# Patient Record
Sex: FEMALE | Race: WHITE | NOT HISPANIC OR LATINO | Employment: OTHER | URBAN - NONMETROPOLITAN AREA
[De-identification: names, ages, dates, MRNs, and addresses within clinical notes are randomized per-mention and may not be internally consistent; named-entity substitution may affect disease eponyms.]

---

## 2024-04-03 ENCOUNTER — LAB (OUTPATIENT)
Dept: LAB | Facility: HOSPITAL | Age: 64
End: 2024-04-03
Payer: COMMERCIAL

## 2024-04-03 ENCOUNTER — OFFICE VISIT (OUTPATIENT)
Dept: INTERNAL MEDICINE | Facility: CLINIC | Age: 64
End: 2024-04-03
Payer: COMMERCIAL

## 2024-04-03 VITALS
OXYGEN SATURATION: 100 % | HEIGHT: 63 IN | HEART RATE: 81 BPM | WEIGHT: 186.4 LBS | BODY MASS INDEX: 33.03 KG/M2 | DIASTOLIC BLOOD PRESSURE: 80 MMHG | TEMPERATURE: 97.4 F | SYSTOLIC BLOOD PRESSURE: 134 MMHG

## 2024-04-03 DIAGNOSIS — I10 ESSENTIAL HYPERTENSION: ICD-10-CM

## 2024-04-03 DIAGNOSIS — E10.3593 TYPE 1 DIABETES MELLITUS WITH PROLIFERATIVE RETINOPATHY OF BOTH EYES, MACULAR EDEMA PRESENCE UNSPECIFIED, UNSPECIFIED PROLIFERATIVE RETINOPATHY TYPE: ICD-10-CM

## 2024-04-03 DIAGNOSIS — Z12.12 ENCOUNTER FOR COLORECTAL CANCER SCREENING: ICD-10-CM

## 2024-04-03 DIAGNOSIS — R94.31 ABNORMAL EKG: ICD-10-CM

## 2024-04-03 DIAGNOSIS — E10.3593 TYPE 1 DIABETES MELLITUS WITH PROLIFERATIVE RETINOPATHY OF BOTH EYES, MACULAR EDEMA PRESENCE UNSPECIFIED, UNSPECIFIED PROLIFERATIVE RETINOPATHY TYPE: Primary | ICD-10-CM

## 2024-04-03 DIAGNOSIS — E10.319 DIABETIC RETINOPATHY OF BOTH EYES ASSOCIATED WITH TYPE 1 DIABETES MELLITUS, MACULAR EDEMA PRESENCE UNSPECIFIED, UNSPECIFIED RETINOPATHY SEVERITY: ICD-10-CM

## 2024-04-03 DIAGNOSIS — Z00.00 HEALTHCARE MAINTENANCE: ICD-10-CM

## 2024-04-03 DIAGNOSIS — M79.7 FIBROMYALGIA: ICD-10-CM

## 2024-04-03 DIAGNOSIS — E55.9 VITAMIN D DEFICIENCY: ICD-10-CM

## 2024-04-03 DIAGNOSIS — E61.1 IRON DEFICIENCY: ICD-10-CM

## 2024-04-03 DIAGNOSIS — Z12.31 ENCOUNTER FOR SCREENING MAMMOGRAM FOR MALIGNANT NEOPLASM OF BREAST: ICD-10-CM

## 2024-04-03 DIAGNOSIS — Z12.11 ENCOUNTER FOR COLORECTAL CANCER SCREENING: ICD-10-CM

## 2024-04-03 LAB
ALBUMIN SERPL-MCNC: 4.4 G/DL (ref 3.5–5.2)
ALBUMIN/GLOB SERPL: 1.4 G/DL
ALP SERPL-CCNC: 81 U/L (ref 39–117)
ALT SERPL W P-5'-P-CCNC: 45 U/L (ref 1–33)
ANION GAP SERPL CALCULATED.3IONS-SCNC: 11 MMOL/L (ref 5–15)
AST SERPL-CCNC: 39 U/L (ref 1–32)
BILIRUB SERPL-MCNC: 0.7 MG/DL (ref 0–1.2)
BUN SERPL-MCNC: 14 MG/DL (ref 8–23)
BUN/CREAT SERPL: 25 (ref 7–25)
CALCIUM SPEC-SCNC: 9.6 MG/DL (ref 8.6–10.5)
CHLORIDE SERPL-SCNC: 102 MMOL/L (ref 98–107)
CO2 SERPL-SCNC: 28 MMOL/L (ref 22–29)
CREAT SERPL-MCNC: 0.56 MG/DL (ref 0.57–1)
DEPRECATED RDW RBC AUTO: 41.6 FL (ref 37–54)
EGFRCR SERPLBLD CKD-EPI 2021: 104 ML/MIN/1.73
ERYTHROCYTE [DISTWIDTH] IN BLOOD BY AUTOMATED COUNT: 12.7 % (ref 12.3–15.4)
GLOBULIN UR ELPH-MCNC: 3.1 GM/DL
GLUCOSE SERPL-MCNC: 128 MG/DL (ref 65–99)
HCT VFR BLD AUTO: 48 % (ref 34–46.6)
HGB BLD-MCNC: 15.4 G/DL (ref 12–15.9)
MCH RBC QN AUTO: 28.7 PG (ref 26.6–33)
MCHC RBC AUTO-ENTMCNC: 32.1 G/DL (ref 31.5–35.7)
MCV RBC AUTO: 89.4 FL (ref 79–97)
PLATELET # BLD AUTO: 185 10*3/MM3 (ref 140–450)
PMV BLD AUTO: 9.1 FL (ref 6–12)
POTASSIUM SERPL-SCNC: 3.4 MMOL/L (ref 3.5–5.2)
PROT SERPL-MCNC: 7.5 G/DL (ref 6–8.5)
RBC # BLD AUTO: 5.37 10*6/MM3 (ref 3.77–5.28)
SODIUM SERPL-SCNC: 141 MMOL/L (ref 136–145)
WBC NRBC COR # BLD AUTO: 11.97 10*3/MM3 (ref 3.4–10.8)

## 2024-04-03 PROCEDURE — 82043 UR ALBUMIN QUANTITATIVE: CPT

## 2024-04-03 PROCEDURE — 82570 ASSAY OF URINE CREATININE: CPT

## 2024-04-03 PROCEDURE — 83036 HEMOGLOBIN GLYCOSYLATED A1C: CPT

## 2024-04-03 PROCEDURE — 82306 VITAMIN D 25 HYDROXY: CPT

## 2024-04-03 PROCEDURE — 83540 ASSAY OF IRON: CPT

## 2024-04-03 PROCEDURE — 85027 COMPLETE CBC AUTOMATED: CPT

## 2024-04-03 PROCEDURE — 80061 LIPID PANEL: CPT

## 2024-04-03 PROCEDURE — 82728 ASSAY OF FERRITIN: CPT

## 2024-04-03 PROCEDURE — 36415 COLL VENOUS BLD VENIPUNCTURE: CPT

## 2024-04-03 PROCEDURE — 84466 ASSAY OF TRANSFERRIN: CPT

## 2024-04-03 PROCEDURE — 80053 COMPREHEN METABOLIC PANEL: CPT

## 2024-04-03 RX ORDER — CHOLECALCIFEROL (VITAMIN D3) 125 MCG
5 CAPSULE ORAL NIGHTLY
COMMUNITY

## 2024-04-03 RX ORDER — MELOXICAM 15 MG/1
15 TABLET ORAL DAILY
COMMUNITY

## 2024-04-03 RX ORDER — FAMOTIDINE 10 MG
10 TABLET ORAL NIGHTLY PRN
COMMUNITY

## 2024-04-03 RX ORDER — FLUTICASONE PROPIONATE 50 MCG
2 SPRAY, SUSPENSION (ML) NASAL DAILY
COMMUNITY

## 2024-04-03 RX ORDER — INSULIN LISPRO 100 [IU]/ML
100 INJECTION, SOLUTION INTRAVENOUS; SUBCUTANEOUS
COMMUNITY

## 2024-04-03 RX ORDER — NAPROXEN 500 MG/1
500 TABLET ORAL 2 TIMES DAILY WITH MEALS
COMMUNITY

## 2024-04-03 RX ORDER — LOSARTAN POTASSIUM AND HYDROCHLOROTHIAZIDE 12.5; 5 MG/1; MG/1
1 TABLET ORAL DAILY
COMMUNITY

## 2024-04-03 RX ORDER — LORATADINE 10 MG/1
10 TABLET ORAL DAILY
COMMUNITY

## 2024-04-03 RX ORDER — ASPIRIN 81 MG/1
81 TABLET, CHEWABLE ORAL DAILY
COMMUNITY

## 2024-04-03 NOTE — PROGRESS NOTES
Chief Complaint   Patient presents with    [facility]    Diabetes         History:  Joan Ivy is a 63 y.o. female who presents today for evaluation of the above problems.      Joan presents today to [facility].  She is from this area but recently moved back 2 weeks ago from [place] to be closer to family.    She has a history of type 1 diabetes with diabetic retinopathy.  She has been on an insulin pump for the last 23 years.  Since moving she has lost 10 pounds and has required less insulin as such.  She has diabetic retinopathy without edema in the left eye and has macular edema in the right eye.  She has been receiving injections in [place] at [facility].    Total daily dose of insulin is up to 100 units/day.    She has never had a colonoscopy due to her glucose levels bottoming out after fasting.  She has tried twice without any success.  She has not had a Cologuard but is interested in getting this test.    Her baseline EKG is reported to be abnormal.  We do not have any records of this.  She has no chest pains, palpitations or shortness of breath.    She is not on a statin due to myalgias.  She has tried 5 different statins all causing myalgias.  She is not sure if she has tried ezetimibe.    She will take as needed meloxicam to help with the fibromyalgia flare.  If the flare lasts longer she will start taking naproxen.  She never takes both medications at the same time.    HPI      ROS:  Review of Systems    As above      Current Outpatient Medications:     aspirin 81 MG chewable tablet, Chew 1 tablet Daily., Disp: , Rfl:     famotidine (PEPCID) 10 MG tablet, Take 1 tablet by mouth At Night As Needed for Heartburn., Disp: , Rfl:     fluticasone (FLONASE) 50 MCG/ACT nasal spray, 2 sprays into the nostril(s) as directed by provider Daily., Disp: , Rfl:     HumaLOG 100 UNIT/ML injection, 100 Units. Up to 100 u per day via pump, Disp: , Rfl:     loratadine (Claritin) 10 MG  "tablet, Take 1 tablet by mouth Daily., Disp: , Rfl:     losartan-hydrochlorothiazide (HYZAAR) 50-12.5 MG per tablet, Take 1 tablet by mouth Daily., Disp: , Rfl:     melatonin 5 MG tablet tablet, Take 1 tablet by mouth Every Night., Disp: , Rfl:     meloxicam (MOBIC) 15 MG tablet, Take 1 tablet by mouth Daily., Disp: , Rfl:     naproxen (NAPROSYN) 500 MG tablet, Take 1 tablet by mouth 2 (Two) Times a Day With Meals., Disp: , Rfl:     No results found for: \"GLUCOSE\", \"BUN\", \"CREATININE\", \"EGFRRESULT\", \"EGFR\", \"BCR\", \"K\", \"CO2\", \"CALCIUM\", \"PROTENTOTREF\", \"ALBUMIN\", \"BILITOT\", \"AST\", \"ALT\"    No results found for: \"WBC\", \"RBC\", \"HGB\", \"HCT\", \"MCV\", \"MCH\", \"MCHC\", \"RDW\", \"RDWSD\", \"MPV\", \"PLT\", \"NEUTRORELPCT\", \"LYMPHORELPCT\", \"MONORELPCT\", \"EOSRELPCT\", \"BASORELPCT\", \"AUTOIGPER\", \"NEUTROABS\", \"LYMPHSABS\", \"MONOSABS\", \"EOSABS\", \"BASOSABS\", \"AUTOIGNUM\", \"NRBC\"      OBJECTIVE:  Visit Vitals  /80 (BP Location: Left arm, Patient Position: Sitting, Cuff Size: Adult)   Pulse 81   Temp 97.4 °F (36.3 °C) (Temporal)   Ht 160 cm (63\")   Wt 84.6 kg (186 lb 6.4 oz)   SpO2 100%   BMI 33.02 kg/m²      Physical Exam  Vitals and nursing note reviewed.   Constitutional:       General: She is not in acute distress.     Appearance: Normal appearance. She is well-developed. She is not diaphoretic.      Comments: Very pleasant   HENT:      Head: Normocephalic and atraumatic.   Eyes:      Pupils: Pupils are equal, round, and reactive to light.   Neck:      Thyroid: No thyromegaly.      Trachea: Phonation normal.   Cardiovascular:      Rate and Rhythm: Normal rate and regular rhythm.      Heart sounds: No murmur heard.  Pulmonary:      Effort: No respiratory distress.      Breath sounds: No wheezing or rales.   Skin:     Coloration: Skin is not pale.      Findings: No erythema.   Neurological:      Mental Status: She is alert.   Psychiatric:         Behavior: Behavior normal.         Thought Content: Thought content normal.         " Judgment: Judgment normal.         ECG 12 Lead    Date/Time: 4/3/2024 3:04 PM  Performed by: RENE Gonzalez MD    Authorized by: RENE Gonzalez MD  Comparison: not compared with previous ECG   Previous ECG: no previous ECG available  Rhythm: sinus rhythm  Rate: normal  BPM: 73  Conduction: conduction normal  ST Depression: V3, V4, V5 and V6  T inversion: V1, V2, V3, V4, V5 and V6  QRS axis: normal    Clinical impression: abnormal EKG            Assessment/Plan    Diagnoses and all orders for this visit:    1. Type 1 diabetes mellitus with proliferative retinopathy of both eyes, macular edema presence unspecified, unspecified proliferative retinopathy type (Primary)  -     Ambulatory Referral to Endocrinology  -     Hemoglobin A1c; Future  -     Microalbumin / Creatinine Urine Ratio - Urine, Clean Catch; Future    2. Essential hypertension    3. Fibromyalgia    4. Healthcare maintenance  -     Comprehensive Metabolic Panel; Future  -     Hemoglobin A1c; Future  -     Lipid Panel; Future    5. Iron deficiency  -     CBC (No Diff); Future  -     Iron Profile; Future  -     Ferritin; Future    6. Vitamin D deficiency  -     Vitamin D,25-Hydroxy; Future    7. Encounter for colorectal cancer screening  -     Cologuard - Stool, Per Rectum; Future    8. Encounter for screening mammogram for malignant neoplasm of breast  -     Mammo Screening Digital Tomosynthesis Bilateral With CAD; Future    9. Diabetic retinopathy of both eyes associated with type 1 diabetes mellitus, macular edema presence unspecified, unspecified retinopathy severity  -     Ambulatory Referral to Ophthalmology    10. Abnormal EKG  -     ECG 12 Lead      We will check some baseline labs today.  This includes CBC, CMP, A1c, lipid panel, microalbumin/creatinine ratio, iron profile, ferritin, and vitamin D.  Further workup or management depending on those results.    Refer to endocrinology to help manage her insulin pump.  I can refill her insulin  in the meantime.    Refer to ophthalmology for her diabetic retinopathy.    She has not tolerated attempts at colonoscopy in the past due to hypoglycemic episodes.  Therefore, we will screen with a Cologuard.    I have ordered mammogram for screening purposes.    She reported baseline EKG that is abnormal.  I do not have record of this, so baseline EKG was obtained today for our records.  She has ST deviation and T wave inversion in the anterolateral leads.  Again, she has no chest pains or other symptoms.      Return in about 6 months (around 10/3/2024) for Recheck A1c.      JOSE MIGUEL Gonzalez MD  13:59 CDT  4/3/2024   Electronically signed

## 2024-04-04 ENCOUNTER — PATIENT ROUNDING (BHMG ONLY) (OUTPATIENT)
Dept: INTERNAL MEDICINE | Facility: CLINIC | Age: 64
End: 2024-04-04
Payer: COMMERCIAL

## 2024-04-04 LAB
25(OH)D3 SERPL-MCNC: 19.5 NG/ML (ref 30–100)
ALBUMIN UR-MCNC: 1.7 MG/DL
CHOLEST SERPL-MCNC: 187 MG/DL (ref 0–200)
CREAT UR-MCNC: 158.6 MG/DL
FERRITIN SERPL-MCNC: 89.6 NG/ML (ref 13–150)
HBA1C MFR BLD: 6.7 % (ref 4.8–5.6)
HDLC SERPL-MCNC: 65 MG/DL (ref 40–60)
IRON 24H UR-MRATE: 59 MCG/DL (ref 37–145)
IRON SATN MFR SERPL: 14 % (ref 20–50)
LDLC SERPL CALC-MCNC: 103 MG/DL (ref 0–100)
LDLC/HDLC SERPL: 1.54 {RATIO}
MICROALBUMIN/CREAT UR: 10.7 MG/G (ref 0–29)
TIBC SERPL-MCNC: 428 MCG/DL (ref 298–536)
TRANSFERRIN SERPL-MCNC: 287 MG/DL (ref 200–360)
TRIGL SERPL-MCNC: 109 MG/DL (ref 0–150)
VLDLC SERPL-MCNC: 19 MG/DL (ref 5–40)

## 2024-04-04 NOTE — PROGRESS NOTES
April 4, 2024    Hello, may I speak with Joan Ivy?    My name is EVERARDO VENTURA      I am  with [facility]  [address], [address]  [place] [address] [phone].    Before we get started may I verify your date of birth? 1960    I am calling to officially welcome you to our practice and ask about your recent visit. Is this a good time to talk? yes    Tell me about your visit with us. What things went well?  IT WAS FINE. HE WAS NICE.       We're always looking for ways to make our patients' experiences even better. Do you have recommendations on ways we may improve?  no    Overall were you satisfied with your first visit to our practice? yes       I appreciate you taking the time to speak with me today. Is there anything else I can do for you? no      Thank you, and have a great day.

## 2024-04-10 DIAGNOSIS — R71.8 ELEVATED HEMATOCRIT: Primary | ICD-10-CM

## 2024-04-10 DIAGNOSIS — E87.6 HYPOKALEMIA: ICD-10-CM

## 2024-04-10 DIAGNOSIS — D72.829 LEUKOCYTOSIS, UNSPECIFIED TYPE: ICD-10-CM

## 2024-04-10 RX ORDER — POTASSIUM CHLORIDE 750 MG/1
10 TABLET, EXTENDED RELEASE ORAL 2 TIMES DAILY
Qty: 4 TABLET | Refills: 0 | Status: SHIPPED | OUTPATIENT
Start: 2024-04-10 | End: 2024-04-12

## 2024-04-15 LAB
NCCN CRITERIA FLAG: NORMAL
TYRER CUZICK SCORE: 12.5

## 2024-04-29 ENCOUNTER — HOSPITAL ENCOUNTER (OUTPATIENT)
Dept: MAMMOGRAPHY | Facility: HOSPITAL | Age: 64
Discharge: HOME | End: 2024-04-29
Admitting: INTERNAL MEDICINE
Payer: COMMERCIAL

## 2024-04-29 DIAGNOSIS — Z12.31 ENCOUNTER FOR SCREENING MAMMOGRAM FOR MALIGNANT NEOPLASM OF BREAST: ICD-10-CM

## 2024-04-29 PROCEDURE — 77063 BREAST TOMOSYNTHESIS BI: CPT

## 2024-04-29 PROCEDURE — 77067 SCR MAMMO BI INCL CAD: CPT

## 2024-06-25 ENCOUNTER — OFFICE VISIT (OUTPATIENT)
Dept: INTERNAL MEDICINE | Facility: CLINIC | Age: 64
End: 2024-06-25
Payer: COMMERCIAL

## 2024-06-25 VITALS
HEIGHT: 63 IN | HEART RATE: 78 BPM | SYSTOLIC BLOOD PRESSURE: 166 MMHG | TEMPERATURE: 98 F | WEIGHT: 183 LBS | DIASTOLIC BLOOD PRESSURE: 86 MMHG | OXYGEN SATURATION: 97 % | BODY MASS INDEX: 32.43 KG/M2

## 2024-06-25 DIAGNOSIS — J06.9 UPPER RESPIRATORY TRACT INFECTION, UNSPECIFIED TYPE: Primary | ICD-10-CM

## 2024-06-25 DIAGNOSIS — H65.93 FLUID LEVEL BEHIND TYMPANIC MEMBRANE OF BOTH EARS: ICD-10-CM

## 2024-06-25 DIAGNOSIS — I10 HYPERTENSION, UNSPECIFIED TYPE: ICD-10-CM

## 2024-06-25 PROCEDURE — 99214 OFFICE O/P EST MOD 30 MIN: CPT

## 2024-06-25 PROCEDURE — 96372 THER/PROPH/DIAG INJ SC/IM: CPT

## 2024-06-25 RX ORDER — LOSARTAN POTASSIUM AND HYDROCHLOROTHIAZIDE 25; 100 MG/1; MG/1
1 TABLET ORAL DAILY
Qty: 30 TABLET | Refills: 1 | Status: SHIPPED | OUTPATIENT
Start: 2024-06-25

## 2024-06-25 RX ORDER — METHYLPREDNISOLONE ACETATE 40 MG/ML
40 INJECTION, SUSPENSION INTRA-ARTICULAR; INTRALESIONAL; INTRAMUSCULAR; SOFT TISSUE ONCE
Status: COMPLETED | OUTPATIENT
Start: 2024-06-25 | End: 2024-06-25

## 2024-06-25 RX ORDER — MELATONIN
1000 DAILY
COMMUNITY

## 2024-06-25 RX ORDER — METHYLPREDNISOLONE SODIUM SUCCINATE 40 MG/ML
40 INJECTION, POWDER, LYOPHILIZED, FOR SOLUTION INTRAMUSCULAR; INTRAVENOUS ONCE
Status: DISCONTINUED | OUTPATIENT
Start: 2024-06-25 | End: 2024-06-25

## 2024-06-25 RX ORDER — LEVOCETIRIZINE DIHYDROCHLORIDE 5 MG/1
5 TABLET, FILM COATED ORAL EVERY EVENING
Qty: 30 TABLET | Refills: 1 | Status: SHIPPED | OUTPATIENT
Start: 2024-06-25

## 2024-06-25 RX ADMIN — METHYLPREDNISOLONE ACETATE 40 MG: 40 INJECTION, SUSPENSION INTRA-ARTICULAR; INTRALESIONAL; INTRAMUSCULAR; SOFT TISSUE at 09:39

## 2024-06-25 NOTE — PROGRESS NOTES
Chief Complaint   Patient presents with    Ear Fullness     right    Swollen Glands     On right side of face     Answers submitted by the patient for this visit:  Primary Reason for Visit (Submitted on 6/25/2024)  What is the primary reason for your visit?: Ear Pain  Ear Pain Questionnaire (Submitted on 6/25/2024)  Chief Complaint: Ear pain  hoarse voice: No  jaw pain: Yes      History:  Joan Ivy is a 64 y.o. female who presents today for evaluation of the above problems.      Ear Fullness   Associated symptoms include headaches and neck pain. Pertinent negatives include no coughing, hearing loss, rash, rhinorrhea or sore throat.   Swollen Glands  Associated symptoms include congestion, headaches, neck pain and swollen glands. Pertinent negatives include no chest pain, coughing, rash or sore throat.     Ms. Ivy presents today for evaluation of sinus congestion and right ear fullness.  She reports that she has had an increase of allergies over the last several months.  Over the course of the last 2 to 3 days she has had an increase of sinus congestion, right ear fullness, and pressure and fluid on the right side of her face.  She also reports postnasal drip that is causing a cough.  She is currently using Claritin periodically and Flonase.  She reports that prolonged use of Claritin gave her an increase in headache.    Blood pressure is elevated today at 195/82 initially.  Blood pressure on repeat was 166/86.  She reports that she monitors blood pressure at home with occasional high of 160s over 80s, typically at home blood pressure is around 140/60-70.  She does report recent increase in headaches but denies chest pain or shortness of breath.      ROS:  Review of Systems   HENT:  Positive for congestion, ear pain and tinnitus. Negative for hearing loss, rhinorrhea and sore throat.    Respiratory:  Negative for cough, chest tightness and shortness of breath.    Cardiovascular:  Negative for chest pain and  palpitations.   Musculoskeletal:  Positive for neck pain.   Skin:  Negative for rash.   Neurological:  Positive for dizziness and headaches.   Hematological:  Positive for adenopathy.         Current Outpatient Medications:     aspirin 81 MG chewable tablet, Chew 1 tablet Daily., Disp: , Rfl:     cholecalciferol (Vitamin D) 25 MCG (1000 UT) tablet, Take 1 tablet by mouth Daily., Disp: , Rfl:     famotidine (PEPCID) 10 MG tablet, Take 1 tablet by mouth At Night As Needed for Heartburn., Disp: , Rfl:     fluticasone (FLONASE) 50 MCG/ACT nasal spray, 2 sprays into the nostril(s) as directed by provider Daily., Disp: , Rfl:     HumaLOG 100 UNIT/ML injection, 100 Units. Up to 100 u per day via pump, Disp: , Rfl:     melatonin 5 MG tablet tablet, Take 1 tablet by mouth Every Night., Disp: , Rfl:     meloxicam (MOBIC) 15 MG tablet, Take 1 tablet by mouth Daily., Disp: , Rfl:     naproxen (NAPROSYN) 500 MG tablet, Take 1 tablet by mouth 2 (Two) Times a Day With Meals., Disp: , Rfl:     levocetirizine (XYZAL) 5 MG tablet, Take 1 tablet by mouth Every Evening., Disp: 30 tablet, Rfl: 1    losartan-hydrochlorothiazide (Hyzaar) 100-25 MG per tablet, Take 1 tablet by mouth Daily., Disp: 30 tablet, Rfl: 1  No current facility-administered medications for this visit.    Lab Results   Component Value Date    GLUCOSE 128 (H) 04/03/2024    BUN 14 04/03/2024    CREATININE 0.56 (L) 04/03/2024    EGFR 104.0 04/03/2024    BCR 25.0 04/03/2024    K 3.4 (L) 04/03/2024    CO2 28.0 04/03/2024    CALCIUM 9.6 04/03/2024    ALBUMIN 4.4 04/03/2024    BILITOT 0.7 04/03/2024    AST 39 (H) 04/03/2024    ALT 45 (H) 04/03/2024       WBC   Date Value Ref Range Status   04/03/2024 11.97 (H) 3.40 - 10.80 10*3/mm3 Final     RBC   Date Value Ref Range Status   04/03/2024 5.37 (H) 3.77 - 5.28 10*6/mm3 Final     Hemoglobin   Date Value Ref Range Status   04/03/2024 15.4 12.0 - 15.9 g/dL Final     Hematocrit   Date Value Ref Range Status   04/03/2024 48.0 (H)  "34.0 - 46.6 % Final     MCV   Date Value Ref Range Status   04/03/2024 89.4 79.0 - 97.0 fL Final     MCH   Date Value Ref Range Status   04/03/2024 28.7 26.6 - 33.0 pg Final     MCHC   Date Value Ref Range Status   04/03/2024 32.1 31.5 - 35.7 g/dL Final     RDW   Date Value Ref Range Status   04/03/2024 12.7 12.3 - 15.4 % Final     RDW-SD   Date Value Ref Range Status   04/03/2024 41.6 37.0 - 54.0 fl Final     MPV   Date Value Ref Range Status   04/03/2024 9.1 6.0 - 12.0 fL Final     Platelets   Date Value Ref Range Status   04/03/2024 185 140 - 450 10*3/mm3 Final         OBJECTIVE:  Visit Vitals  /86   Pulse 78   Temp 98 °F (36.7 °C) (Oral)   Ht 160 cm (63\")   Wt 83 kg (183 lb)   SpO2 97%   BMI 32.42 kg/m²      Physical Exam  Constitutional:       Appearance: Normal appearance.   HENT:      Head: Normocephalic.      Right Ear: A middle ear effusion is present. No mastoid tenderness. Tympanic membrane is not perforated or erythematous.      Left Ear: A middle ear effusion is present. No mastoid tenderness. Tympanic membrane is not perforated or erythematous.      Nose: Congestion and rhinorrhea present.      Mouth/Throat:      Mouth: Mucous membranes are moist.   Eyes:      Pupils: Pupils are equal, round, and reactive to light.   Cardiovascular:      Rate and Rhythm: Normal rate and regular rhythm.      Pulses: Normal pulses.      Heart sounds: Normal heart sounds.   Pulmonary:      Effort: Pulmonary effort is normal.      Breath sounds: Normal breath sounds.   Abdominal:      General: Bowel sounds are normal.      Palpations: Abdomen is soft.   Musculoskeletal:         General: Normal range of motion.      Cervical back: Normal range of motion.   Lymphadenopathy:      Cervical: Cervical adenopathy present.   Skin:     General: Skin is warm and dry.      Capillary Refill: Capillary refill takes less than 2 seconds.   Neurological:      Mental Status: She is alert and oriented to person, place, and time. "   Psychiatric:         Mood and Affect: Mood normal.         Behavior: Behavior normal.         Thought Content: Thought content normal.         Judgment: Judgment normal.         Assessment/Plan    Diagnoses and all orders for this visit:    1. Upper respiratory tract infection, unspecified type (Primary)  -     levocetirizine (XYZAL) 5 MG tablet; Take 1 tablet by mouth Every Evening.  Dispense: 30 tablet; Refill: 1  -     Discontinue: methylPREDNISolone sodium succinate (SOLU-Medrol) injection 40 mg  -     methylPREDNISolone acetate (DEPO-medrol) injection 40 mg    2. Fluid level behind tympanic membrane of both ears  -     levocetirizine (XYZAL) 5 MG tablet; Take 1 tablet by mouth Every Evening.  Dispense: 30 tablet; Refill: 1  -     Discontinue: methylPREDNISolone sodium succinate (SOLU-Medrol) injection 40 mg  -     methylPREDNISolone acetate (DEPO-medrol) injection 40 mg    3. Hypertension, unspecified type  -     losartan-hydrochlorothiazide (Hyzaar) 100-25 MG per tablet; Take 1 tablet by mouth Daily.  Dispense: 30 tablet; Refill: 1      Blood pressure is significantly elevated today.  Initially blood pressure was 195/82 followed by 166/86.  She has had a recent increase in headaches but denies chest pain or shortness of breath.  Increase losartan-hydrochlorothiazide to 100-25 mg.  I have instructed her to monitor blood pressure at home daily, call if blood pressures consistently elevated greater than 140/90 or less than 100/60.  Return in 2 weeks to recheck blood pressure.  Seek emergency medical help if any chest pain or persistent shortness of breath occur.    Upper respiratory infection 1 dose of methylprednisolone today to decrease fluid and drainage.  She is aware to closely monitor blood sugar over the next few days.  Also recommend twice daily saline nasal rinses with distilled water followed by Afrin 15 minutes after saline nasal rinses she is aware to only use Afrin for 3 days.  Use Flonase after  Afrin twice daily for the next week then return to once daily.      Return in about 2 weeks (around 7/9/2024).      TRISHA Colon  08:44 CDT  6/25/2024   Electronically signed

## 2024-06-25 NOTE — PATIENT INSTRUCTIONS
-Neilmed saline nasal rinse with distilled water twice daily,   - use afrin 15minutes after each saline nasal rinse (3 days max)  - use flonase after afrin.

## 2024-07-01 ENCOUNTER — PATIENT OUTREACH (OUTPATIENT)
Dept: CASE MANAGEMENT | Facility: OTHER | Age: 64
End: 2024-07-01
Payer: COMMERCIAL

## 2024-07-01 NOTE — OUTREACH NOTE
[facility] Enrollment    Outreach call made to pt identified for [facility]. Pt politely declines at this time. States she has a lot going on. Does report that her bp has improved since pcp increased her medication dose less than a week ago.    Was the enrollment attempt to reach the patient successful?: yes  Date/Time of successful contact: 7/1/2024  3:31 PM  Patient response: not interested       Sara HECTOR  Ambulatory Case Management    7/1/2024, 15:31 EDT

## 2024-08-18 DIAGNOSIS — I10 HYPERTENSION, UNSPECIFIED TYPE: ICD-10-CM

## 2024-08-19 NOTE — TELEPHONE ENCOUNTER
Rx Refill Note  Requested Prescriptions     Pending Prescriptions Disp Refills    losartan-hydrochlorothiazide (HYZAAR) 100-25 MG per tablet [Pharmacy Med Name: LOSARTAN-HCTZ 100-25 MG TAB] 30 tablet 1     Sig: TAKE 1 TABLET BY MOUTH EVERY DAY      Last office visit with prescribing clinician: 6/25/2024   Last telemedicine visit with prescribing clinician: Visit date not found   Next office visit with prescribing clinician: Visit date not found                         Would you like a call back once the refill request has been completed: [] Yes [] No    If the office needs to give you a call back, can they leave a voicemail: [] Yes [] No    Juaquin Fitzgerald MA  08/19/24, 09:04 CDT

## 2024-08-20 RX ORDER — LOSARTAN POTASSIUM AND HYDROCHLOROTHIAZIDE 25; 100 MG/1; MG/1
1 TABLET ORAL DAILY
Qty: 30 TABLET | Refills: 1 | Status: SHIPPED | OUTPATIENT
Start: 2024-08-20

## 2024-08-20 NOTE — TELEPHONE ENCOUNTER
Her blood pressure was significantly elevated at her last appointment, she needs to come back and to have her blood pressure reevaluated.  I have authorized 1 refill to get her through until she is able to come in.

## 2024-10-13 DIAGNOSIS — I10 HYPERTENSION, UNSPECIFIED TYPE: ICD-10-CM

## 2024-10-14 RX ORDER — LOSARTAN POTASSIUM AND HYDROCHLOROTHIAZIDE 25; 100 MG/1; MG/1
1 TABLET ORAL DAILY
Qty: 30 TABLET | Refills: 5 | Status: SHIPPED | OUTPATIENT
Start: 2024-10-14

## 2024-10-14 NOTE — TELEPHONE ENCOUNTER
Rx Refill Note  Requested Prescriptions     Pending Prescriptions Disp Refills    losartan-hydrochlorothiazide (HYZAAR) 100-25 MG per tablet [Pharmacy Med Name: LOSARTAN-HCTZ 100-25 MG TAB] 30 tablet 1     Sig: TAKE 1 TABLET BY MOUTH EVERY DAY      Last office visit with prescribing clinician: 6/25/2024   Last telemedicine visit with prescribing clinician: Visit date not found   Next office visit with prescribing clinician: Visit date not found                         Would you like a call back once the refill request has been completed: [] Yes [] No    If the office needs to give you a call back, can they leave a voicemail: [] Yes [] No    Juaquin Fitzgerald MA  10/14/24, 09:08 CDT

## 2024-10-17 DIAGNOSIS — I10 HYPERTENSION, UNSPECIFIED TYPE: ICD-10-CM

## 2024-10-17 RX ORDER — LOSARTAN POTASSIUM AND HYDROCHLOROTHIAZIDE 25; 100 MG/1; MG/1
1 TABLET ORAL DAILY
Qty: 30 TABLET | Refills: 1 | OUTPATIENT
Start: 2024-10-17

## 2024-10-17 NOTE — TELEPHONE ENCOUNTER
Rx Refill Note  Requested Prescriptions     Pending Prescriptions Disp Refills    losartan-hydrochlorothiazide (HYZAAR) 100-25 MG per tablet [Pharmacy Med Name: LOSARTAN-HCTZ 100-25 MG TAB] 30 tablet 1     Sig: TAKE 1 TABLET BY MOUTH EVERY DAY      Last office visit with prescribing clinician: 6/25/2024   Last telemedicine visit with prescribing clinician: Visit date not found   Next office visit with prescribing clinician: Visit date not found                         Would you like a call back once the refill request has been completed: [] Yes [] No    If the office needs to give you a call back, can they leave a voicemail: [] Yes [] No    Juaquin Fitzgerald MA  10/17/24, 11:02 CDT

## 2024-10-21 ENCOUNTER — LAB (OUTPATIENT)
Dept: LAB | Facility: HOSPITAL | Age: 64
End: 2024-10-21
Payer: COMMERCIAL

## 2024-10-21 DIAGNOSIS — D72.829 LEUKOCYTOSIS, UNSPECIFIED TYPE: ICD-10-CM

## 2024-10-21 DIAGNOSIS — R71.8 ELEVATED HEMATOCRIT: ICD-10-CM

## 2024-10-21 DIAGNOSIS — I10 HYPERTENSION, UNSPECIFIED TYPE: ICD-10-CM

## 2024-10-21 LAB
DEPRECATED RDW RBC AUTO: 42.2 FL (ref 37–54)
ERYTHROCYTE [DISTWIDTH] IN BLOOD BY AUTOMATED COUNT: 13 % (ref 12.3–15.4)
HCT VFR BLD AUTO: 45.1 % (ref 34–46.6)
HGB BLD-MCNC: 15.3 G/DL (ref 12–15.9)
MCH RBC QN AUTO: 30.5 PG (ref 26.6–33)
MCHC RBC AUTO-ENTMCNC: 33.9 G/DL (ref 31.5–35.7)
MCV RBC AUTO: 89.8 FL (ref 79–97)
PLATELET # BLD AUTO: 170 10*3/MM3 (ref 140–450)
PMV BLD AUTO: 9.2 FL (ref 6–12)
RBC # BLD AUTO: 5.02 10*6/MM3 (ref 3.77–5.28)
WBC NRBC COR # BLD AUTO: 10.34 10*3/MM3 (ref 3.4–10.8)

## 2024-10-21 PROCEDURE — 85027 COMPLETE CBC AUTOMATED: CPT

## 2024-10-21 PROCEDURE — 36415 COLL VENOUS BLD VENIPUNCTURE: CPT

## 2024-10-22 DIAGNOSIS — I10 HYPERTENSION, UNSPECIFIED TYPE: ICD-10-CM

## 2024-10-22 RX ORDER — LOSARTAN POTASSIUM AND HYDROCHLOROTHIAZIDE 25; 100 MG/1; MG/1
1 TABLET ORAL DAILY
Qty: 30 TABLET | Refills: 5 | Status: SHIPPED | OUTPATIENT
Start: 2024-10-22

## 2024-10-22 RX ORDER — LOSARTAN POTASSIUM AND HYDROCHLOROTHIAZIDE 25; 100 MG/1; MG/1
1 TABLET ORAL DAILY
Qty: 30 TABLET | Refills: 1 | OUTPATIENT
Start: 2024-10-22

## 2024-10-22 NOTE — TELEPHONE ENCOUNTER
Rx Refill Note  Requested Prescriptions     Pending Prescriptions Disp Refills    losartan-hydrochlorothiazide (HYZAAR) 100-25 MG per tablet [Pharmacy Med Name: LOSARTAN-HCTZ 100-25 MG TAB] 30 tablet 1     Sig: TAKE 1 TABLET BY MOUTH EVERY DAY      Last office visit with prescribing clinician: 6/25/2024   Last telemedicine visit with prescribing clinician: Visit date not found   Next office visit with prescribing clinician: Visit date not found                         Would you like a call back once the refill request has been completed: [] Yes [] No    If the office needs to give you a call back, can they leave a voicemail: [] Yes [] No    Juaquin Fitzgerald MA  10/22/24, 11:17 CDT

## 2024-10-25 ENCOUNTER — OFFICE VISIT (OUTPATIENT)
Dept: INTERNAL MEDICINE | Facility: CLINIC | Age: 64
End: 2024-10-25
Payer: COMMERCIAL

## 2024-10-25 VITALS
HEIGHT: 63 IN | SYSTOLIC BLOOD PRESSURE: 130 MMHG | TEMPERATURE: 98.1 F | HEART RATE: 73 BPM | DIASTOLIC BLOOD PRESSURE: 76 MMHG | OXYGEN SATURATION: 98 % | WEIGHT: 184.8 LBS | BODY MASS INDEX: 32.74 KG/M2

## 2024-10-25 DIAGNOSIS — Z23 NEED FOR IMMUNIZATION AGAINST INFLUENZA: ICD-10-CM

## 2024-10-25 DIAGNOSIS — R53.82 CHRONIC FATIGUE: ICD-10-CM

## 2024-10-25 DIAGNOSIS — E10.3593 TYPE 1 DIABETES MELLITUS WITH PROLIFERATIVE RETINOPATHY OF BOTH EYES, MACULAR EDEMA PRESENCE UNSPECIFIED, UNSPECIFIED PROLIFERATIVE RETINOPATHY TYPE: Primary | ICD-10-CM

## 2024-10-25 DIAGNOSIS — I10 ESSENTIAL HYPERTENSION: ICD-10-CM

## 2024-10-25 DIAGNOSIS — R74.8 ELEVATED LIVER ENZYMES: ICD-10-CM

## 2024-10-25 DIAGNOSIS — E55.9 VITAMIN D DEFICIENCY: ICD-10-CM

## 2024-10-25 DIAGNOSIS — G47.33 OSA (OBSTRUCTIVE SLEEP APNEA): ICD-10-CM

## 2024-10-25 LAB
EXPIRATION DATE: ABNORMAL
HBA1C MFR BLD: 7.2 % (ref 4.5–5.7)
Lab: ABNORMAL

## 2024-10-25 PROCEDURE — 90471 IMMUNIZATION ADMIN: CPT | Performed by: INTERNAL MEDICINE

## 2024-10-25 PROCEDURE — 99214 OFFICE O/P EST MOD 30 MIN: CPT | Performed by: INTERNAL MEDICINE

## 2024-10-25 PROCEDURE — 90656 IIV3 VACC NO PRSV 0.5 ML IM: CPT | Performed by: INTERNAL MEDICINE

## 2024-10-25 PROCEDURE — 83036 HEMOGLOBIN GLYCOSYLATED A1C: CPT | Performed by: INTERNAL MEDICINE

## 2024-10-25 NOTE — PROGRESS NOTES
Chief Complaint   Patient presents with    Follow-up    Diabetes     A1c=7.2         History:  Joan Ivy is a 64 y.o. female who presents today for evaluation of the above problems.      HPI  History of Present Illness  The patient presents for a 6-month follow-up on diabetes.    She has been receiving care in [place] by endocrinology, where adjustments were made to her insulin pump. She is uncertain of her A1c levels but reports experiencing both high and low blood sugar levels. Her most recent readings were 120 and 111. She has a new insulin pump and continues to experience low blood sugar levels upon waking. She had a consultation with Gema Marcelino on 08/13/2024, during which further adjustments were made to her pump. She plans to have another consultation at the end of 11/2024.    She has been taking vitamin D supplements and reports feeling exhausted, which she attributes to her responsibilities in caring for her . She underwent gallbladder removal surgery 2 years ago and experienced significant weakness post-surgery.  Symptoms have persisted since then.     She has been diagnosed with sleep apnea but does not use a CPAP machine.     Her blood pressure medication was increased 4 months ago, and she has been monitoring her blood pressure regularly. She reports that her blood pressure has been low, and she is considering whether her medication could be contributing to her feelings of fatigue. She has always taken her blood pressure medication in the morning and is considering whether taking it at night might improve her sleep. She reports feeling unwell even before the change in her blood pressure medication and is unsure if the change has worsened her condition. She took a half dose of her blood pressure medication yesterday and today due to a shortage of medication, and her blood pressure remained stable.    ROS:  Review of Systems  As above      Current Outpatient Medications:     aspirin 81 MG  chewable tablet, Chew 1 tablet Daily., Disp: , Rfl:     cholecalciferol (Vitamin D) 25 MCG (1000 UT) tablet, Take 1 tablet by mouth Daily., Disp: , Rfl:     famotidine (PEPCID) 10 MG tablet, Take 1 tablet by mouth At Night As Needed for Heartburn., Disp: , Rfl:     fluticasone (FLONASE) 50 MCG/ACT nasal spray, Administer 2 sprays into the nostril(s) as directed by provider Daily., Disp: , Rfl:     HumaLOG 100 UNIT/ML injection, 100 Units. Up to 100 u per day via pump, Disp: , Rfl:     levocetirizine (XYZAL) 5 MG tablet, Take 1 tablet by mouth Every Evening., Disp: 30 tablet, Rfl: 1    losartan-hydrochlorothiazide (HYZAAR) 100-25 MG per tablet, Take 1 tablet by mouth Daily., Disp: 30 tablet, Rfl: 5    melatonin 5 MG tablet tablet, Take 1 tablet by mouth Every Night., Disp: , Rfl:     meloxicam (MOBIC) 15 MG tablet, Take 1 tablet by mouth Daily., Disp: , Rfl:     naproxen (NAPROSYN) 500 MG tablet, Take 1 tablet by mouth 2 (Two) Times a Day With Meals., Disp: , Rfl:     Lab Results   Component Value Date    GLUCOSE 128 (H) 04/03/2024    BUN 14 04/03/2024    CREATININE 0.56 (L) 04/03/2024     04/03/2024    K 3.4 (L) 04/03/2024     04/03/2024    CALCIUM 9.6 04/03/2024    PROTEINTOT 7.5 04/03/2024    ALBUMIN 4.4 04/03/2024    ALT 45 (H) 04/03/2024    AST 39 (H) 04/03/2024    ALKPHOS 81 04/03/2024    BILITOT 0.7 04/03/2024    GLOB 3.1 04/03/2024    AGRATIO 1.4 04/03/2024    BCR 25.0 04/03/2024    ANIONGAP 11.0 04/03/2024    EGFR 104.0 04/03/2024       WBC   Date Value Ref Range Status   10/21/2024 10.34 3.40 - 10.80 10*3/mm3 Final     RBC   Date Value Ref Range Status   10/21/2024 5.02 3.77 - 5.28 10*6/mm3 Final     Hemoglobin   Date Value Ref Range Status   10/21/2024 15.3 12.0 - 15.9 g/dL Final     Hematocrit   Date Value Ref Range Status   10/21/2024 45.1 34.0 - 46.6 % Final     MCV   Date Value Ref Range Status   10/21/2024 89.8 79.0 - 97.0 fL Final     MCH   Date Value Ref Range Status   10/21/2024 30.5  "26.6 - 33.0 pg Final     MCHC   Date Value Ref Range Status   10/21/2024 33.9 31.5 - 35.7 g/dL Final     RDW   Date Value Ref Range Status   10/21/2024 13.0 12.3 - 15.4 % Final     RDW-SD   Date Value Ref Range Status   10/21/2024 42.2 37.0 - 54.0 fl Final     MPV   Date Value Ref Range Status   10/21/2024 9.2 6.0 - 12.0 fL Final     Platelets   Date Value Ref Range Status   10/21/2024 170 140 - 450 10*3/mm3 Final         OBJECTIVE:  Visit Vitals  /76 (BP Location: Left arm, Patient Position: Sitting, Cuff Size: Adult)   Pulse 73   Temp 98.1 °F (36.7 °C) (Temporal)   Ht 160 cm (63\")   Wt 83.8 kg (184 lb 12.8 oz)   SpO2 98%   BMI 32.74 kg/m²      Physical Exam  Vitals and nursing note reviewed.   Constitutional:       General: She is not in acute distress.     Appearance: Normal appearance. She is well-developed. She is not ill-appearing or diaphoretic.      Comments: Pleasant     HENT:      Head: Normocephalic and atraumatic.   Eyes:      Pupils: Pupils are equal, round, and reactive to light.   Neck:      Thyroid: No thyromegaly.      Trachea: Phonation normal.   Pulmonary:      Effort: No respiratory distress.   Skin:     Coloration: Skin is not pale.      Findings: No erythema.   Neurological:      Mental Status: She is alert.   Psychiatric:         Behavior: Behavior normal.         Thought Content: Thought content normal.         Judgment: Judgment normal.         Results  Laboratory Studies  A1c was 7.2% today.    Assessment/Plan      Diagnoses and all orders for this visit:    1. Type 1 diabetes mellitus with proliferative retinopathy of both eyes, macular edema presence unspecified, unspecified proliferative retinopathy type (Primary)  -     POC Glycosylated Hemoglobin (Hb A1C)    2. Essential hypertension    3. Need for immunization against influenza  -     Fluzone >6mos ([id number])    4. Vitamin D deficiency  -     Vitamin D,25-Hydroxy; Future    5. Chronic fatigue  -     Vitamin B12; Future  -     " TSH; Future  -     Comprehensive metabolic panel; Future  -     Vitamin D,25-Hydroxy; Future    6. Elevated liver enzymes  -     Comprehensive metabolic panel; Future    7. BERTRAM (obstructive sleep apnea)      Assessment & Plan  1. Diabetes Mellitus.  Her A1c level has increased from 6.7 in April 2024 to 7.2 currently. She has experienced fluctuations in blood sugar levels, including lows in the morning. Adjustments were made to her insulin pump in August 2024, and further tweaks are planned for the end of November 2024 by endocrinology. Labs will be ordered to assess liver enzymes, vitamin D, B12, and TSH levels. The results will be forwarded to her endocrinologist.    2. Chronic Fatigue.  She reports feeling exhausted and lacking stamina, potentially due to inadequate rest and sleep apnea. A blood workup will be conducted to check liver enzymes, vitamin D, B12, and TSH levels. If the blood work does not reveal any abnormalities, a sleep study may be considered to evaluate for sleep apnea.  I do suspect untreated sleep apnea is either significantly contributing or causing her symptoms of fatigue.    3. Hypertension.  Her blood pressure has been stable after an increase in her medication dosage. She has been monitoring it regularly, and it has remained within a normal range.     She received an influenza vaccine today.  She tolerated this well without immediate side effects or issues.        Return in about 6 months (around 4/25/2025) for Annual physical.      JOSE MIGUEL Gonzalez MD  10:24 CDT  10/25/2024   Electronically signed      Patient or patient representative verbalized consent for the use of Ambient Listening during the visit with  RENE Gonzalez MD for chart documentation. 10/25/2024  10:53 CDT

## 2025-04-18 DIAGNOSIS — I10 HYPERTENSION, UNSPECIFIED TYPE: ICD-10-CM

## 2025-04-18 RX ORDER — LOSARTAN POTASSIUM AND HYDROCHLOROTHIAZIDE 25; 100 MG/1; MG/1
1 TABLET ORAL DAILY
Qty: 90 TABLET | Refills: 3 | Status: SHIPPED | OUTPATIENT
Start: 2025-04-18

## 2025-04-29 ENCOUNTER — LAB (OUTPATIENT)
Dept: LAB | Facility: HOSPITAL | Age: 65
End: 2025-04-29
Payer: COMMERCIAL

## 2025-04-29 DIAGNOSIS — E55.9 VITAMIN D DEFICIENCY: ICD-10-CM

## 2025-04-29 DIAGNOSIS — R53.82 CHRONIC FATIGUE: ICD-10-CM

## 2025-04-29 DIAGNOSIS — R74.8 ELEVATED LIVER ENZYMES: ICD-10-CM

## 2025-04-29 LAB
25(OH)D3 SERPL-MCNC: 44.2 NG/ML (ref 30–100)
ALBUMIN SERPL-MCNC: 4.1 G/DL (ref 3.5–5.2)
ALBUMIN/GLOB SERPL: 1.6 G/DL
ALP SERPL-CCNC: 78 U/L (ref 39–117)
ALT SERPL W P-5'-P-CCNC: 33 U/L (ref 1–33)
ANION GAP SERPL CALCULATED.3IONS-SCNC: 9 MMOL/L (ref 5–15)
AST SERPL-CCNC: 28 U/L (ref 1–32)
BILIRUB SERPL-MCNC: 1 MG/DL (ref 0–1.2)
BUN SERPL-MCNC: 11 MG/DL (ref 8–23)
BUN/CREAT SERPL: 16.7 (ref 7–25)
CALCIUM SPEC-SCNC: 9.6 MG/DL (ref 8.6–10.5)
CHLORIDE SERPL-SCNC: 100 MMOL/L (ref 98–107)
CO2 SERPL-SCNC: 31 MMOL/L (ref 22–29)
CREAT SERPL-MCNC: 0.66 MG/DL (ref 0.57–1)
EGFRCR SERPLBLD CKD-EPI 2021: 99.3 ML/MIN/1.73
GLOBULIN UR ELPH-MCNC: 2.6 GM/DL
GLUCOSE SERPL-MCNC: 133 MG/DL (ref 65–99)
POTASSIUM SERPL-SCNC: 3.9 MMOL/L (ref 3.5–5.2)
PROT SERPL-MCNC: 6.7 G/DL (ref 6–8.5)
SODIUM SERPL-SCNC: 140 MMOL/L (ref 136–145)
TSH SERPL DL<=0.05 MIU/L-ACNC: 1.54 UIU/ML (ref 0.27–4.2)
VIT B12 BLD-MCNC: 586 PG/ML (ref 211–946)

## 2025-04-29 PROCEDURE — 80053 COMPREHEN METABOLIC PANEL: CPT

## 2025-04-29 PROCEDURE — 36415 COLL VENOUS BLD VENIPUNCTURE: CPT

## 2025-04-29 PROCEDURE — 84443 ASSAY THYROID STIM HORMONE: CPT

## 2025-04-29 PROCEDURE — 82607 VITAMIN B-12: CPT

## 2025-04-29 PROCEDURE — 82306 VITAMIN D 25 HYDROXY: CPT

## 2025-05-02 ENCOUNTER — OFFICE VISIT (OUTPATIENT)
Dept: INTERNAL MEDICINE | Facility: CLINIC | Age: 65
End: 2025-05-02
Payer: COMMERCIAL

## 2025-05-02 ENCOUNTER — LAB (OUTPATIENT)
Dept: LAB | Facility: HOSPITAL | Age: 65
End: 2025-05-02
Payer: COMMERCIAL

## 2025-05-02 VITALS
OXYGEN SATURATION: 98 % | HEIGHT: 63 IN | DIASTOLIC BLOOD PRESSURE: 82 MMHG | WEIGHT: 186 LBS | HEART RATE: 74 BPM | BODY MASS INDEX: 32.96 KG/M2 | SYSTOLIC BLOOD PRESSURE: 136 MMHG

## 2025-05-02 DIAGNOSIS — E10.3593 TYPE 1 DIABETES MELLITUS WITH PROLIFERATIVE RETINOPATHY OF BOTH EYES, MACULAR EDEMA PRESENCE UNSPECIFIED, UNSPECIFIED PROLIFERATIVE RETINOPATHY TYPE: ICD-10-CM

## 2025-05-02 DIAGNOSIS — E55.9 VITAMIN D DEFICIENCY: ICD-10-CM

## 2025-05-02 DIAGNOSIS — Z00.00 ANNUAL PHYSICAL EXAM: ICD-10-CM

## 2025-05-02 DIAGNOSIS — I10 ESSENTIAL HYPERTENSION: ICD-10-CM

## 2025-05-02 DIAGNOSIS — Z00.00 ANNUAL PHYSICAL EXAM: Primary | ICD-10-CM

## 2025-05-02 DIAGNOSIS — E66.811 CLASS 1 OBESITY DUE TO EXCESS CALORIES WITH SERIOUS COMORBIDITY AND BODY MASS INDEX (BMI) OF 32.0 TO 32.9 IN ADULT: ICD-10-CM

## 2025-05-02 DIAGNOSIS — E66.09 CLASS 1 OBESITY DUE TO EXCESS CALORIES WITH SERIOUS COMORBIDITY AND BODY MASS INDEX (BMI) OF 32.0 TO 32.9 IN ADULT: ICD-10-CM

## 2025-05-02 DIAGNOSIS — E10.319 DIABETIC RETINOPATHY OF BOTH EYES ASSOCIATED WITH TYPE 1 DIABETES MELLITUS, MACULAR EDEMA PRESENCE UNSPECIFIED, UNSPECIFIED RETINOPATHY SEVERITY: ICD-10-CM

## 2025-05-02 DIAGNOSIS — Z13.31 DEPRESSION SCREEN: ICD-10-CM

## 2025-05-02 LAB
ALBUMIN UR-MCNC: <1.2 MG/DL
CHOLEST SERPL-MCNC: 183 MG/DL (ref 0–200)
CREAT UR-MCNC: 67.3 MG/DL
DEPRECATED RDW RBC AUTO: 41 FL (ref 37–54)
ERYTHROCYTE [DISTWIDTH] IN BLOOD BY AUTOMATED COUNT: 12.5 % (ref 12.3–15.4)
HBA1C MFR BLD: 8.7 % (ref 4.8–5.6)
HCT VFR BLD AUTO: 41.1 % (ref 34–46.6)
HCV AB SER QL: NORMAL
HDLC SERPL-MCNC: 74 MG/DL (ref 40–60)
HGB BLD-MCNC: 13.7 G/DL (ref 12–15.9)
LDLC SERPL CALC-MCNC: 92 MG/DL (ref 0–100)
LDLC/HDLC SERPL: 1.22 {RATIO}
MCH RBC QN AUTO: 29.8 PG (ref 26.6–33)
MCHC RBC AUTO-ENTMCNC: 33.3 G/DL (ref 31.5–35.7)
MCV RBC AUTO: 89.3 FL (ref 79–97)
MICROALBUMIN/CREAT UR: NORMAL MG/G{CREAT}
PLATELET # BLD AUTO: 167 10*3/MM3 (ref 140–450)
PMV BLD AUTO: 9.4 FL (ref 6–12)
RBC # BLD AUTO: 4.6 10*6/MM3 (ref 3.77–5.28)
TRIGL SERPL-MCNC: 93 MG/DL (ref 0–150)
VLDLC SERPL-MCNC: 17 MG/DL (ref 5–40)
WBC NRBC COR # BLD AUTO: 9.17 10*3/MM3 (ref 3.4–10.8)

## 2025-05-02 PROCEDURE — 83036 HEMOGLOBIN GLYCOSYLATED A1C: CPT

## 2025-05-02 PROCEDURE — 82570 ASSAY OF URINE CREATININE: CPT

## 2025-05-02 PROCEDURE — 86803 HEPATITIS C AB TEST: CPT

## 2025-05-02 PROCEDURE — 82043 UR ALBUMIN QUANTITATIVE: CPT

## 2025-05-02 PROCEDURE — 80061 LIPID PANEL: CPT

## 2025-05-02 PROCEDURE — 85027 COMPLETE CBC AUTOMATED: CPT

## 2025-05-02 PROCEDURE — 36415 COLL VENOUS BLD VENIPUNCTURE: CPT

## 2025-05-02 RX ORDER — LORATADINE 10 MG/1
10 TABLET ORAL DAILY
COMMUNITY

## 2025-05-02 NOTE — PROGRESS NOTES
Chief Complaint   Patient presents with    Annual Exam       History:  Joan Ivy is a 64 y.o. female who presents today for evaluation of the above problems.      HPI  History of Present Illness  The patient is a 64-year-old female who presents for an annual exam.    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 Respicare therapy was initiated a few weeks ago and has been beneficial. A history of allergies is noted, with relocation to the current residence a year ago introducing new allergens. Xyzal was previously used for inner ear issues but was discontinued due to unusual dreams, and Claritin was resumed approximately 3 weeks ago with effective results. Flonase is used intermittently due to dryness from daily use. Diabetic retinopathy is managed with injections from a retina specialist every 4 months.    A cholecystectomy was performed a few years ago, resulting in weight loss from 215 pounds to 175 pounds. Recently, an increase in weight has been observed, accompanied by food cravings, particularly for salty foods in the morning. Blood work from a year ago indicated low potassium levels, but recent labs were within normal limits. Vitamin D supplements are taken regularly. An attempt to have labs drawn on Monday was unsuccessful due to a scheduling error. Blood sugar levels were between 40 and 50 six months ago but have been slightly elevated in the mornings over the past few weeks. A diabetic diet is followed, and she remains active at home. Alcohol consumption includes two glasses of wine nightly. A dental exam has not been conducted in a long time, and plans to obtain new glasses are mentioned. No medication refills are required at this time, as a 90-day supply of blood pressure medication was received last week.    Under the care of an endocrinologist in [place], the possibility of semaglutide or tirzepatide therapy has been discussed to  aid in weight loss. A history of urinary tract infections (UTIs) is noted, but none are present currently. There is no family history of thyroid cancer or personal history of pancreatitis.     PAST SURGICAL HISTORY:  - Cholecystectomy    SOCIAL HISTORY  She drinks wine every night, typically two glasses. She does not endorse tobacco or drug use.    FAMILY HISTORY  She reports no family history of thyroid cancer.    Social History     Socioeconomic History    Marital status:    Tobacco Use    Smoking status: Never    Smokeless tobacco: Never   Vaping Use    Vaping status: Never Used   Substance and Sexual Activity    Alcohol use: Yes     Alcohol/week: 4.0 standard drinks of alcohol     Types: 4 Glasses of wine per week    Drug use: Never    Sexual activity: Yes     Partners: Male     Birth control/protection: Post-menopausal       PHQ-9 Depression Screening  Little interest or pleasure in doing things? Not at all   Feeling down, depressed, or hopeless? Not at all   PHQ-2 Total Score 0   Trouble falling or staying asleep, or sleeping too much?     Feeling tired or having little energy?     Poor appetite or overeating?     Feeling bad about yourself - or that you are a failure or have let yourself or your family down?     Trouble concentrating on things, such as reading the newspaper or watching television?     Moving or speaking so slowly that other people could have noticed? Or the opposite - being so fidgety or restless that you have been moving around a lot more than usual?       Thoughts that you would be better off dead, or of hurting yourself in some way?     PHQ-9 Total Score     If you checked off any problems, how difficult have these problems made it for you to do your work, take care of things at home, or get along with other people? Not difficult at all       PHQ-9 Total Score:        ROS:  Review of Systems   Respiratory:  Negative for chest tightness and shortness of breath.          Current  Outpatient Medications:     aspirin 81 MG chewable tablet, Chew 1 tablet Daily., Disp: , Rfl:     cholecalciferol (Vitamin D) 25 MCG (1000 UT) tablet, Take 1 tablet by mouth Daily., Disp: , Rfl:     famotidine (PEPCID) 10 MG tablet, Take 1 tablet by mouth At Night As Needed for Heartburn., Disp: , Rfl:     fluticasone (FLONASE) 50 MCG/ACT nasal spray, Administer 2 sprays into the nostril(s) as directed by provider Daily., Disp: , Rfl:     HumaLOG 100 UNIT/ML injection, 100 Units. Up to 100 u per day via pump, Disp: , Rfl:     loratadine (Claritin) 10 MG tablet, Take 1 tablet by mouth Daily., Disp: , Rfl:     losartan-hydrochlorothiazide (HYZAAR) 100-25 MG per tablet, Take 1 tablet by mouth Daily., Disp: 90 tablet, Rfl: 3    melatonin 5 MG tablet tablet, Take 1 tablet by mouth Every Night., Disp: , Rfl:     meloxicam (MOBIC) 15 MG tablet, Take 1 tablet by mouth Daily., Disp: , Rfl:     naproxen (NAPROSYN) 500 MG tablet, Take 1 tablet by mouth 2 (Two) Times a Day With Meals., Disp: , Rfl:     levocetirizine (XYZAL) 5 MG tablet, Take 1 tablet by mouth Every Evening. (Patient not taking: Reported on 5/2/2025), Disp: 30 tablet, Rfl: 1    Lab Results   Component Value Date    GLUCOSE 133 (H) 04/29/2025    BUN 11 04/29/2025    CREATININE 0.66 04/29/2025     04/29/2025    K 3.9 04/29/2025     04/29/2025    CALCIUM 9.6 04/29/2025    PROTEINTOT 6.7 04/29/2025    ALBUMIN 4.1 04/29/2025    ALT 33 04/29/2025    AST 28 04/29/2025    ALKPHOS 78 04/29/2025    BILITOT 1.0 04/29/2025    GLOB 2.6 04/29/2025    AGRATIO 1.6 04/29/2025    BCR 16.7 04/29/2025    ANIONGAP 9.0 04/29/2025    EGFR 99.3 04/29/2025       WBC   Date Value Ref Range Status   05/02/2025 9.17 3.40 - 10.80 10*3/mm3 Final     RBC   Date Value Ref Range Status   05/02/2025 4.60 3.77 - 5.28 10*6/mm3 Final     Hemoglobin   Date Value Ref Range Status   05/02/2025 13.7 12.0 - 15.9 g/dL Final     Hematocrit   Date Value Ref Range Status   05/02/2025 41.1 34.0  "- 46.6 % Final     MCV   Date Value Ref Range Status   05/02/2025 89.3 79.0 - 97.0 fL Final     MCH   Date Value Ref Range Status   05/02/2025 29.8 26.6 - 33.0 pg Final     MCHC   Date Value Ref Range Status   05/02/2025 33.3 31.5 - 35.7 g/dL Final     RDW   Date Value Ref Range Status   05/02/2025 12.5 12.3 - 15.4 % Final     RDW-SD   Date Value Ref Range Status   05/02/2025 41.0 37.0 - 54.0 fl Final     MPV   Date Value Ref Range Status   05/02/2025 9.4 6.0 - 12.0 fL Final     Platelets   Date Value Ref Range Status   05/02/2025 167 140 - 450 10*3/mm3 Final         OBJECTIVE:  Visit Vitals  /82 (BP Location: Right arm, Patient Position: Sitting, Cuff Size: Adult)   Pulse 74   Ht 160 cm (63\")   Wt 84.4 kg (186 lb)   SpO2 98%   BMI 32.95 kg/m²      Physical Exam  Constitutional:       Appearance: Normal appearance. She is obese.      Comments: Pleasant   HENT:      Head: Normocephalic.      Right Ear: Tympanic membrane normal.      Left Ear: Tympanic membrane normal.      Mouth/Throat:      Mouth: Mucous membranes are moist.   Cardiovascular:      Rate and Rhythm: Normal rate and regular rhythm.      Heart sounds: Normal heart sounds.   Pulmonary:      Effort: Pulmonary effort is normal.      Breath sounds: Normal breath sounds.   Abdominal:      General: Bowel sounds are normal.      Palpations: Abdomen is soft.   Skin:     General: Skin is warm and dry.   Neurological:      Mental Status: She is alert and oriented to person, place, and time.   Psychiatric:         Mood and Affect: Mood normal.         Behavior: Behavior normal.         Thought Content: Thought content normal.         Judgment: Judgment normal.       Physical Exam  Respiratory: Clear to auscultation, no wheezing, rales or rhonchi    Results  Labs   - Vitamin D: Normal   - Thyroid: Normal   - Metabolic panel: Fine   - Potassium: Fine    Assessment/Plan    Diagnoses and all orders for this visit:    1. Annual physical exam (Primary)  -     CBC " (No Diff); Future  -     Hemoglobin A1c; Future  -     Hepatitis C Antibody; Future  -     Lipid Panel; Future    2. Type 1 diabetes mellitus with proliferative retinopathy of both eyes, macular edema presence unspecified, unspecified proliferative retinopathy type  -     Hemoglobin A1c; Future  -     Microalbumin / Creatinine Urine Ratio - Urine, Clean Catch; Future    3. Vitamin D deficiency    4. Essential hypertension    5. Diabetic retinopathy of both eyes associated with type 1 diabetes mellitus, macular edema presence unspecified, unspecified retinopathy severity    6. Depression screen    7. Class 1 obesity due to excess calories with serious comorbidity and body mass index (BMI) of 32.0 to 32.9 in adult      Assessment & Plan  1. Annual examination.  - Blood pressure readings are within the normal range today. Heart rate and oxygen saturation levels are also satisfactory.  - Liver enzymes, which were slightly elevated a year ago, have returned to normal levels as per the most recent lab results.  - A comprehensive set of labs will be ordered, including complete blood count (CBC), diabetes screening, and cholesterol panel.    2. Type 1 Diabetes Mellitus.  - Reports that blood sugar levels have been slightly higher in the mornings over the last couple of weeks.  - Advised to continue following a diabetic diet and monitor blood sugar levels closely.  - The potential addition of medications like Ozempic or Mounjaro was discussed, which could help with weight loss and indirectly assist in managing blood sugar levels by suppressing appetite.  - Will consult her endocrinologist regarding this option.    3. Allergic Rhinitis.  - Has been using Claritin and occasionally Flonase for allergy symptoms.  - Reports that Xyzal caused unusual dreams, so has reverted to Claritin.  - Advised to use Flonase as needed but not daily to avoid dryness.    4. Diabetic Retinopathy.  - Continues to see a retina specialist every four  months and receives injections as needed.  - No changes to the current management plan are necessary.    5. Weight Management.  - Experienced weight fluctuations, previously dropping from 215 lbs to 175 lbs after gallbladder surgery but now reports gaining weight again.  - The potential benefits of medications like Ozempic or Mounjaro for weight loss were discussed.  - Eligible for referral to a bariatric clinic, which offers both medical and surgical weight loss options.  - Will consider these options and follow up as needed.    Pressures well-controlled today per JNC 8 guidelines less than 140/90, blood pressure today is 136/82.    Depression screen negative, PHQ 2 score of 0.    Recommend decreasing alcohol intake to no more than 7 drinks per week.     Follow-up  The patient is scheduled for a follow-up visit in 6 months.  Counseling/Anticipatory Guidance Discussed: nutrition, physical activity, healthy weight, injury prevention, misuse of tobacco, alcohol and drugs, dental health, mental health, immunizations, screenings, and self-breast exam      Return in about 6 months (around 11/2/2025) for Next scheduled follow up.    Patient was given instructions and counseling regarding his/her condition or for health maintenance advice. Please see specific information pulled into the AVS if appropriate.     TRISHA Colon   10:44 CDT  5/2/2025  Electronically signed       Patient or patient representative verbalized consent for the use of Ambient Listening during the visit with  TRISHA Colon for chart documentation. 5/2/2025  13:11 CDT